# Patient Record
Sex: FEMALE | Race: WHITE
[De-identification: names, ages, dates, MRNs, and addresses within clinical notes are randomized per-mention and may not be internally consistent; named-entity substitution may affect disease eponyms.]

---

## 2021-04-09 PROBLEM — Z00.00 ENCOUNTER FOR PREVENTIVE HEALTH EXAMINATION: Status: ACTIVE | Noted: 2021-04-09

## 2021-05-01 ENCOUNTER — APPOINTMENT (OUTPATIENT)
Dept: ORTHOPEDIC SURGERY | Facility: CLINIC | Age: 81
End: 2021-05-01

## 2021-05-11 ENCOUNTER — APPOINTMENT (OUTPATIENT)
Dept: RHEUMATOLOGY | Facility: CLINIC | Age: 81
End: 2021-05-11
Payer: MEDICARE

## 2021-05-11 VITALS
SYSTOLIC BLOOD PRESSURE: 146 MMHG | HEART RATE: 92 BPM | DIASTOLIC BLOOD PRESSURE: 91 MMHG | OXYGEN SATURATION: 93 % | TEMPERATURE: 98.3 F | BODY MASS INDEX: 38.24 KG/M2 | WEIGHT: 170 LBS | HEIGHT: 56 IN | RESPIRATION RATE: 16 BRPM

## 2021-05-11 DIAGNOSIS — Z82.61 FAMILY HISTORY OF ARTHRITIS: ICD-10-CM

## 2021-05-11 DIAGNOSIS — Z78.9 OTHER SPECIFIED HEALTH STATUS: ICD-10-CM

## 2021-05-11 PROCEDURE — 99204 OFFICE O/P NEW MOD 45 MIN: CPT

## 2021-05-11 PROCEDURE — 99072 ADDL SUPL MATRL&STAF TM PHE: CPT

## 2021-05-11 RX ORDER — ESCITALOPRAM OXALATE 5 MG/1
5 TABLET ORAL
Refills: 0 | Status: ACTIVE | COMMUNITY
Start: 2021-05-11

## 2021-05-11 RX ORDER — ZOLEDRONIC ACID 5 MG/100ML
5 INJECTION, SOLUTION INTRAVENOUS
Refills: 0 | Status: ACTIVE | COMMUNITY
Start: 2021-05-11

## 2021-05-11 RX ORDER — METFORMIN HYDROCHLORIDE 500 MG/1
500 TABLET, COATED ORAL
Refills: 0 | Status: ACTIVE | COMMUNITY
Start: 2021-05-11

## 2021-05-11 RX ORDER — CALCIUM CARBONATE/VITAMIN D3 600 MG-20
600-800 TABLET ORAL
Refills: 0 | Status: ACTIVE | COMMUNITY
Start: 2021-05-11

## 2021-05-11 RX ORDER — ROSUVASTATIN CALCIUM 10 MG/1
10 TABLET, FILM COATED ORAL
Refills: 0 | Status: ACTIVE | COMMUNITY
Start: 2021-05-11

## 2021-05-11 RX ORDER — METOPROLOL TARTRATE 25 MG/1
25 TABLET, FILM COATED ORAL
Refills: 0 | Status: ACTIVE | COMMUNITY
Start: 2021-05-11

## 2021-05-11 RX ORDER — B-COMPLEX WITH VITAMIN C
1250 (500 CA) TABLET ORAL
Refills: 0 | Status: ACTIVE | COMMUNITY
Start: 2021-05-11

## 2021-05-12 NOTE — HISTORY OF PRESENT ILLNESS
[FreeTextEntry1] : Referring physician: Dr. Dean Farah\par \par 80 y/o F here for consultation\par \par Pt reports she had rheumatic fever at age 9, and had valvular issues. Pt had medication in [place], and pt improved. \par \par Pt says she has issues with back. Pt said she had sciatica when she was 21. \par \par Pt 5 years had fracture of several vertebrae. Pt had to get surgery. Pt after this pt has pain in lower spine. Pain goes to knees sometimes, and LLE. Pt needs medication. Pt denies any urinary or fecal incontinence. \par No swelling, stiffness. \par \par Pt uses PT. \par Pt takes advil, which doesn’t help much. \par Pt also gets oxycodone, but it doesn’t help much. \par Pt has had injections years ago, but does not help. \par Pt has never had gabapentin. \par \par Pt has hx of OP, and gets reclast yearly. Was on prolia but had reaction. She was changed to reclast this year. \par \par no fevers, SOB, CP, abdominal pain, n/v/d, rashes, joint pain, swelling

## 2021-05-12 NOTE — ASSESSMENT
[FreeTextEntry1] : 82 y/o F w OP, thoracic/lumbar chronic compression fx, DDD, presents w back pain\par =chronic back pain from compressions fx, DDD\par =pain occasionally radiating to knees\par =pain worse w activities\par =s/p thoracic kyphoplasty \par =MRI 2021 w thoracic/lumbar chronic compression fx, DDD\par =DEXA scan 2021 w OP\par =on reclast\par \par Pain radiating from knees is likely from mechanical issues in thoracic/lumbar spine. Pt can have knee OA. However, doubt pt has inflammatory arthritis. Will r/o inflammatory process. \par \par For chronic back pain, started gabapentin. 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 Do not want to cause sedation and lower respiratory drive. Counseled on possible side effects of gabapentin, including sedation, ataxia, constipation. \par \par Plan\par -Labs w serologies, inflammatory markers\par -Xrays knees\par -gabapentin 100mg HS\par RTO in 2 weeks to discuss dx and treatment plan\par \par \par Plan\par rx gabapentin 100mg\par

## 2021-05-12 NOTE — DATA REVIEWED
[FreeTextEntry1] : MRI of thoracic/lumbar spine reviewed from 2/21\par Multiple chronic fractures from thoracic and lumbar spine. Kyphoplasty from T6 to T9. DDD, disc bulging, and protrusion. \par \par DEXA scan result from 2/21\par T ranges w osteoporosis, lowest at L4 of -3.4

## 2021-05-12 NOTE — CONSULT LETTER
[Dear  ___] : Dear  [unfilled], [Consult Letter:] : I had the pleasure of evaluating your patient, [unfilled]. [Consult Closing:] : Thank you very much for allowing me to participate in the care of this patient.  If you have any questions, please do not hesitate to contact me. [Sincerely,] : Sincerely, [FreeTextEntry3] : Ari Vallecillo MD

## 2021-05-12 NOTE — PHYSICAL EXAM
[General Appearance - Well Nourished] : well nourished [Sclera] : the sclera and conjunctiva were normal [General Appearance - Well Developed] : well developed [FreeTextEntry1] : expiratory meagan  [Heart Sounds] : normal S1 and S2 [Heart Sounds Gallop] : no gallops [Murmurs] : no murmurs [Heart Sounds Pericardial Friction Rub] : no pericardial rub [Abdomen Soft] : soft [Abdomen Tenderness] : non-tender [Musculoskeletal - Swelling] : no joint swelling seen [] : no rash [Oriented To Time, Place, And Person] : oriented to person, place, and time

## 2021-05-21 ENCOUNTER — NON-APPOINTMENT (OUTPATIENT)
Age: 81
End: 2021-05-21

## 2021-05-28 RX ORDER — TRAMADOL HYDROCHLORIDE 50 MG/1
50 TABLET, COATED ORAL
Qty: 60 | Refills: 0 | Status: ACTIVE | COMMUNITY
Start: 2021-05-28 | End: 1900-01-01

## 2021-05-28 RX ORDER — GABAPENTIN 100 MG/1
100 CAPSULE ORAL
Qty: 100 | Refills: 0 | Status: DISCONTINUED | COMMUNITY
Start: 2021-05-11 | End: 2021-05-28

## 2021-06-08 ENCOUNTER — APPOINTMENT (OUTPATIENT)
Dept: RHEUMATOLOGY | Facility: CLINIC | Age: 81
End: 2021-06-08

## 2021-06-10 ENCOUNTER — APPOINTMENT (OUTPATIENT)
Dept: RHEUMATOLOGY | Facility: CLINIC | Age: 81
End: 2021-06-10
Payer: MEDICARE

## 2021-06-10 VITALS
SYSTOLIC BLOOD PRESSURE: 134 MMHG | TEMPERATURE: 97.9 F | DIASTOLIC BLOOD PRESSURE: 85 MMHG | RESPIRATION RATE: 16 BRPM | HEART RATE: 102 BPM | HEIGHT: 56 IN | WEIGHT: 174 LBS | BODY MASS INDEX: 39.14 KG/M2 | OXYGEN SATURATION: 93 %

## 2021-06-10 DIAGNOSIS — M81.0 AGE-RELATED OSTEOPOROSIS W/OUT CURRENT PATHOLOGICAL FRACTURE: ICD-10-CM

## 2021-06-10 LAB
ALBUMIN SERPL ELPH-MCNC: 4.2 G/DL
ALP BLD-CCNC: 151 U/L
ALT SERPL-CCNC: 12 U/L
ANION GAP SERPL CALC-SCNC: 15 MMOL/L
AST SERPL-CCNC: 17 U/L
BASOPHILS # BLD AUTO: 0.14 K/UL
BASOPHILS NFR BLD AUTO: 1.7 %
BILIRUB SERPL-MCNC: 0.6 MG/DL
BUN SERPL-MCNC: 20 MG/DL
CALCIUM SERPL-MCNC: 9.3 MG/DL
CHLORIDE SERPL-SCNC: 99 MMOL/L
CO2 SERPL-SCNC: 27 MMOL/L
CREAT SERPL-MCNC: 1.05 MG/DL
CRP SERPL-MCNC: 5 MG/L
EOSINOPHIL # BLD AUTO: 0.27 K/UL
EOSINOPHIL NFR BLD AUTO: 3.2 %
ERYTHROCYTE [SEDIMENTATION RATE] IN BLOOD BY WESTERGREN METHOD: 53 MM/HR
GLUCOSE SERPL-MCNC: 94 MG/DL
HCT VFR BLD CALC: 41.9 %
HGB BLD-MCNC: 13.2 G/DL
IMM GRANULOCYTES NFR BLD AUTO: 0.4 %
LYMPHOCYTES # BLD AUTO: 1.97 K/UL
LYMPHOCYTES NFR BLD AUTO: 23.5 %
MAN DIFF?: NORMAL
MCHC RBC-ENTMCNC: 30.7 PG
MCHC RBC-ENTMCNC: 31.5 GM/DL
MCV RBC AUTO: 97.4 FL
MONOCYTES # BLD AUTO: 0.74 K/UL
MONOCYTES NFR BLD AUTO: 8.8 %
NEUTROPHILS # BLD AUTO: 5.22 K/UL
NEUTROPHILS NFR BLD AUTO: 62.4 %
PLATELET # BLD AUTO: 371 K/UL
POTASSIUM SERPL-SCNC: 3.7 MMOL/L
PROT SERPL-MCNC: 7.2 G/DL
RBC # BLD: 4.3 M/UL
RBC # FLD: 12.5 %
SODIUM SERPL-SCNC: 141 MMOL/L
WBC # FLD AUTO: 8.37 K/UL

## 2021-06-10 PROCEDURE — 99072 ADDL SUPL MATRL&STAF TM PHE: CPT

## 2021-06-10 PROCEDURE — 99214 OFFICE O/P EST MOD 30 MIN: CPT

## 2021-06-10 RX ORDER — DICLOFENAC SODIUM 1% 10 MG/G
1 GEL TOPICAL
Qty: 1 | Refills: 3 | Status: ACTIVE | COMMUNITY
Start: 2021-06-10 | End: 1900-01-01

## 2021-06-10 NOTE — DATA REVIEWED
[FreeTextEntry1] : Xrays done 5/21\par knee OA\par \par labs reviewed 5/21\par ESR 53, CRP 5\par alk phos 150s

## 2021-06-10 NOTE — PHYSICAL EXAM
[General Appearance - Well Nourished] : well nourished [General Appearance - Well Developed] : well developed [Sclera] : the sclera and conjunctiva were normal [FreeTextEntry1] : expiratory meagan  [Heart Sounds] : normal S1 and S2 [Heart Sounds Gallop] : no gallops [Murmurs] : no murmurs [Heart Sounds Pericardial Friction Rub] : no pericardial rub [Abdomen Soft] : soft [Abdomen Tenderness] : non-tender [] : no rash [Musculoskeletal - Swelling] : no joint swelling seen [Oriented To Time, Place, And Person] : oriented to person, place, and time

## 2021-06-10 NOTE — HISTORY OF PRESENT ILLNESS
[___ Month(s) Ago] : [unfilled] month(s) ago [FreeTextEntry1] : =pt still w back pain\par =tramadol and gabapentin made her too sleepy\par =has knee pain, but no often\par =no fevers, SOB, CP, abdominal pain, n/v/d, rashes\par

## 2021-06-10 NOTE — ASSESSMENT
[FreeTextEntry1] : 80 y/o F w OA, OP, thoracic/lumbar chronic compression fx, DDD, presents w back pain\par =chronic back pain from compressions fx, DDD\par =pain occasionally radiating to knees\par =pain worse w activities\par =s/p thoracic kyphoplasty \par =MRI 2021 w thoracic/lumbar chronic compression fx, DDD\par =DEXA scan 2021 w OP\par =xrays done 5/21 w knee OA\par =on reclast\par \par Explained not a lot of options I can offer to thoracic/lumbar DDD. Pt should see spine surgery for more options (epidural injections). \par For knee OA, which is not major issue for her, will rx voltaren gel PRN. \par \par Plan\par -PT referral\par -spine surgery referral\par -volataren gel PRN\par RTO PRN \par \par \par

## 2021-07-09 DIAGNOSIS — M17.10 UNILATERAL PRIMARY OSTEOARTHRITIS, UNSPECIFIED KNEE: ICD-10-CM

## 2021-07-10 DIAGNOSIS — M25.562 PAIN IN RIGHT KNEE: ICD-10-CM

## 2021-07-10 DIAGNOSIS — M19.90 UNSPECIFIED OSTEOARTHRITIS, UNSPECIFIED SITE: ICD-10-CM

## 2021-07-10 DIAGNOSIS — M25.561 PAIN IN RIGHT KNEE: ICD-10-CM

## 2021-07-10 DIAGNOSIS — M51.9 UNSPECIFIED THORACIC, THORACOLUMBAR AND LUMBOSACRAL INTERVERTEBRAL DISC DISORDER: ICD-10-CM

## 2021-09-30 ENCOUNTER — APPOINTMENT (OUTPATIENT)
Dept: ORTHOPEDIC SURGERY | Facility: CLINIC | Age: 81
End: 2021-09-30